# Patient Record
Sex: MALE | Race: AMERICAN INDIAN OR ALASKA NATIVE
[De-identification: names, ages, dates, MRNs, and addresses within clinical notes are randomized per-mention and may not be internally consistent; named-entity substitution may affect disease eponyms.]

---

## 2019-12-23 ENCOUNTER — HOSPITAL ENCOUNTER (INPATIENT)
Dept: HOSPITAL 5 - ED | Age: 74
LOS: 1 days | Discharge: HOME | DRG: 282 | End: 2019-12-24
Attending: INTERNAL MEDICINE | Admitting: INTERNAL MEDICINE
Payer: MEDICARE

## 2019-12-23 DIAGNOSIS — E78.5: ICD-10-CM

## 2019-12-23 DIAGNOSIS — Z82.49: ICD-10-CM

## 2019-12-23 DIAGNOSIS — I10: ICD-10-CM

## 2019-12-23 DIAGNOSIS — I21.4: Primary | ICD-10-CM

## 2019-12-23 DIAGNOSIS — R00.1: ICD-10-CM

## 2019-12-23 DIAGNOSIS — M19.90: ICD-10-CM

## 2019-12-23 DIAGNOSIS — Z91.14: ICD-10-CM

## 2019-12-23 DIAGNOSIS — N40.0: ICD-10-CM

## 2019-12-23 DIAGNOSIS — F12.90: ICD-10-CM

## 2019-12-23 LAB
ALBUMIN SERPL-MCNC: 4.4 G/DL (ref 3.9–5)
ALT SERPL-CCNC: 16 UNITS/L (ref 7–56)
APTT BLD: 29.9 SEC. (ref 24.2–36.6)
BASOPHILS # (AUTO): 0 K/MM3 (ref 0–0.1)
BASOPHILS NFR BLD AUTO: 0.5 % (ref 0–1.8)
BUN SERPL-MCNC: 6 MG/DL (ref 9–20)
BUN/CREAT SERPL: 8 %
CALCIUM SERPL-MCNC: 9.4 MG/DL (ref 8.4–10.2)
EOSINOPHIL # BLD AUTO: 0 K/MM3 (ref 0–0.4)
EOSINOPHIL NFR BLD AUTO: 0.2 % (ref 0–4.3)
HCT VFR BLD CALC: 43.5 % (ref 35.5–45.6)
HDLC SERPL-MCNC: 66 MG/DL (ref 40–59)
HEMOLYSIS INDEX: 11
HGB BLD-MCNC: 14.6 GM/DL (ref 11.8–15.2)
INR PPP: 0.93 (ref 0.87–1.13)
LYMPHOCYTES # BLD AUTO: 1.1 K/MM3 (ref 1.2–5.4)
LYMPHOCYTES NFR BLD AUTO: 28.7 % (ref 13.4–35)
MCHC RBC AUTO-ENTMCNC: 34 % (ref 32–34)
MCV RBC AUTO: 93 FL (ref 84–94)
MONOCYTES # (AUTO): 0.3 K/MM3 (ref 0–0.8)
MONOCYTES % (AUTO): 8 % (ref 0–7.3)
PLATELET # BLD: 169 K/MM3 (ref 140–440)
RBC # BLD AUTO: 4.66 M/MM3 (ref 3.65–5.03)

## 2019-12-23 PROCEDURE — 80053 COMPREHEN METABOLIC PANEL: CPT

## 2019-12-23 PROCEDURE — 93458 L HRT ARTERY/VENTRICLE ANGIO: CPT

## 2019-12-23 PROCEDURE — 80061 LIPID PANEL: CPT

## 2019-12-23 PROCEDURE — 93005 ELECTROCARDIOGRAM TRACING: CPT

## 2019-12-23 PROCEDURE — 93010 ELECTROCARDIOGRAM REPORT: CPT

## 2019-12-23 PROCEDURE — 85025 COMPLETE CBC W/AUTO DIFF WBC: CPT

## 2019-12-23 PROCEDURE — 36415 COLL VENOUS BLD VENIPUNCTURE: CPT

## 2019-12-23 PROCEDURE — 85610 PROTHROMBIN TIME: CPT

## 2019-12-23 PROCEDURE — 83036 HEMOGLOBIN GLYCOSYLATED A1C: CPT

## 2019-12-23 PROCEDURE — C1894 INTRO/SHEATH, NON-LASER: HCPCS

## 2019-12-23 PROCEDURE — 71045 X-RAY EXAM CHEST 1 VIEW: CPT

## 2019-12-23 PROCEDURE — 84484 ASSAY OF TROPONIN QUANT: CPT

## 2019-12-23 PROCEDURE — 85730 THROMBOPLASTIN TIME PARTIAL: CPT

## 2019-12-23 NOTE — EMERGENCY DEPARTMENT REPORT
ED Chest Pain HPI





- General


Chief Complaint: Chest Pain


Stated Complaint: EUGENE/CHEST PAIN


Time Seen by Provider: 12/23/19 12:20


Source: patient


Mode of arrival: Ambulatory


Limitations: No Limitations





- History of Present Illness


Initial Comments: 





74-year-old male with a past medical history of hypertension presents to the 

hospital complaining of substernal chest pain since 10 AM.  Pain started while 

at rest and while sitting in the bed. Pain is constant but fluctuating in 

intensity.  Pain is described as a burning aching.  Pain is 7-8/10 in intensity.

 Patient denies nausea, vomiting, shortness of breath, diaphoresis, or 

lightheadedness.  Patient also denies history of PE/DVT, calf tenderness, recent

travel, or recent surgery.  Patient does not smoke cigarettes but smokes 

marijuana on occasion.  Although he has a diagnosis hypertension he has not had 

medications in the past 3 years.  Patient reports he had a stress test 

approximately 3 years ago and it was "okay".  No history of cardiac, CAD, or MI.

 He denies family history of CAD prior to that is a 65.  Patient does not take 

aspirin daily.  His primary care doctor is affiliated with [facility]





initial triage pulse ox on RA 99%, rr 20


Severity scale (0 -10): 0





- Related Data


                                Home Medications











 Medication  Instructions  Recorded  Confirmed  Last Taken


 


lisinopriL [Zestril] 40 mg PO QDAY 07/14/15 07/14/15 Unknown











                                    Allergies











Allergy/AdvReac Type Severity Reaction Status Date / Time


 


No Known Allergies Allergy   Verified 07/14/15 14:41














Heart Score





- HEART Score


History: Slightly suspicious


EKG: Non-specific


Age: > 65


Risk factors: 1-2 risk factors


Troponin: 1-3x normal limit


HEART Score: 5





ED Review of Systems


ROS: 


Stated complaint: EUGENE/CHEST PAIN


Other details as noted in HPI





Comment: All other systems reviewed and negative





ED Past Medical Hx





- Past Medical History


Previous Medical History?: Yes


Hx Hypertension: Yes


Hx Arthritis: Yes





- Surgical History


Past Surgical History?: No





- Social History


Smoking Status: Never Smoker


Substance Use Type: None





- Medications


Home Medications: 


                                Home Medications











 Medication  Instructions  Recorded  Confirmed  Last Taken  Type


 


lisinopriL [Zestril] 40 mg PO QDAY 07/14/15 07/14/15 Unknown History














ED Physical Exam





- General


Limitations: No Limitations





- Other


Other exam information: 





General: No acute distress


Head: Atraumatic


Eyes: normal appearance


ENT: Moist mucous membranes


Neck: Normal appearance, no midline tenderness


Chest: Clear to auscultation bilaterally, chest wall nontender


CV: Regular rate and rhythm


Abdomen: Soft, normal bowel sounds, nontender, nondistended, no rebound or 

guarding


Back: Normal inspection


Extremity: Normal inspection infection, full range of motion, no calf tenderness

 or leg edema


Neuro: Alert O x 3, no facial asymmetry, speech clear, no gross motor sensory 

deficit


Psych: Appropriate behavior


Skin: No rash





ED Course


                                   Vital Signs











  12/23/19





  12:21


 


Temperature 97.8 F


 


Pulse Rate 53 L


 


Blood Pressure 187/87














- Reevaluation(s)


Reevaluation #1: 





12/23/19 16:06


pt is pain free








ANATOLIY score





- Anatoliy Score


Age > 65: (1) Yes


3 or more CAD Risk Factors: (0) No


2 or more Angina events in past 24 hrs: (1) Yes


Known CAD with more than 50% Stenosis: (0) No


Elevated Cardiac Markers: (0) No


ST Deviation Greater than 0.5mm: (0) No





ED Medical Decision Making





- Lab Data


Result diagrams: 


                                 12/23/19 13:58





                                 12/23/19 13:58








                                   Lab Results











  12/23/19 12/23/19 12/23/19 Range/Units





  13:58 13:58 15:07 


 


WBC  3.9 L    (4.5-11.0)  K/mm3


 


RBC  4.66    (3.65-5.03)  M/mm3


 


Hgb  14.6    (11.8-15.2)  gm/dl


 


Hct  43.5    (35.5-45.6)  %


 


MCV  93    (84-94)  fl


 


MCH  31    (28-32)  pg


 


MCHC  34    (32-34)  %


 


RDW  13.3    (13.2-15.2)  %


 


Plt Count  169    (140-440)  K/mm3


 


Lymph % (Auto)  28.7    (13.4-35.0)  %


 


Mono % (Auto)  8.0 H    (0.0-7.3)  %


 


Eos % (Auto)  0.2    (0.0-4.3)  %


 


Baso % (Auto)  0.5    (0.0-1.8)  %


 


Lymph #  1.1 L    (1.2-5.4)  K/mm3


 


Mono #  0.3    (0.0-0.8)  K/mm3


 


Eos #  0.0    (0.0-0.4)  K/mm3


 


Baso #  0.0    (0.0-0.1)  K/mm3


 


Seg Neutrophils %  62.6    (40.0-70.0)  %


 


Seg Neutrophils #  2.5    (1.8-7.7)  K/mm3


 


Sodium   141   (137-145)  mmol/L


 


Potassium   4.5   (3.6-5.0)  mmol/L


 


Chloride   102.8   ()  mmol/L


 


Carbon Dioxide   22   (22-30)  mmol/L


 


Anion Gap   21   mmol/L


 


BUN   6 L   (9-20)  mg/dL


 


Creatinine   0.8   (0.8-1.5)  mg/dL


 


Estimated GFR   > 60   ml/min


 


BUN/Creatinine Ratio   8   %


 


Glucose   84   ()  mg/dL


 


Calcium   9.4   (8.4-10.2)  mg/dL


 


Total Bilirubin   0.60   (0.1-1.2)  mg/dL


 


AST   22   (5-40)  units/L


 


ALT   16   (7-56)  units/L


 


Alkaline Phosphatase   73   ()  units/L


 


Troponin T   0.015  0.068 H D  (0.00-0.029)  ng/mL


 


Total Protein   7.4   (6.3-8.2)  g/dL


 


Albumin   4.4   (3.9-5)  g/dL


 


Albumin/Globulin Ratio   1.5   %


 


Triglycerides    74  (2-149)  mg/dL


 


Cholesterol    165  ()  mg/dL


 


LDL Cholesterol Direct    87  ()  mg/dL














- EKG Data


-: EKG Interpreted by Me (rbbb)


EKG shows normal: sinus rhythm, ST-T waves (no stemi)


Rate: normal





- Radiology Data


Radiology results: report reviewed (cxr: HTN sánchez)





- Medical Decision Making





cp that resolved prior to my evaluation


trop elevated (nl renal fxt)


asa, heparin drip, card consult in ed


admission for nstemi





- Differential Diagnosis


cp, pe, stemi, disection, msk pain, mi, unstable angina


Critical Care Time: Yes


Critical care time in (mins) excluding proc time.: 35


Critical care attestation.: 


If time is entered above; I have spent that time in minutes in the direct care 

of this critically ill patient, excluding procedure time.








ED Disposition


Clinical Impression: 


 Chest pain, Non-STEMI (non-ST elevated myocardial infarction), HTN 

(hypertension), Noncompliance with medication regimen





Disposition: DC-09 OP ADMIT IP TO THIS HOSP


Is pt being admited?: Yes


Does the pt Need Aspirin: Yes


Condition: Stable


Time of Disposition: 16:00 (Dr Brown/hosp)

## 2019-12-23 NOTE — XRAY REPORT
CHEST 1 VIEW 



INDICATION: 

Chest Pain.



COMPARISON: 

None.



FINDINGS:

Support devices: None.



Heart: Within normal limits. 

Lungs/Pleura: The lungs are normally expanded and clear. No airspace disease or pleural effusion. 



Additional findings: Aortic tortuosity and elongation.



IMPRESSION:

1. No acute findings. 

2. Hypertensive changes in the aorta.



Signer Name: Dennis Lo MD 

Signed: 12/23/2019 1:12 PM

 Workstation Name: [id number]

## 2019-12-23 NOTE — EVENT NOTE
ED Screening Note


Date of service: 12/23/19


Time: 12:21


ED Screening Note: 





C/o substernal CP x 10 am


denies hx of MI/CVA/DVT/PE


Hx of HTN


aching pain


denies SOB





This initial assessment/diagnostic orders/clinical plan/treatment(s) is/are 

subject to change based on patients health status, clinical progression and re-

assessment by fellow clinical providers in the ED. Further treatment and workup 

at subsequent clinical providers discretion. Patient/guardian urged not to elope

from the ED as their condition may be serious if not clinically assessed and 

managed. 





Initial orders include: MAIN


Aspirin 


CXR


Labs


EKG

## 2019-12-23 NOTE — CONSULTATION
History of Present Illness


Consult date: 12/23/19


Requesting physician: ELISE SNYDER


Consult reason: chest pain


History of present illness: 


Mr. Cotton is a 75 y/o  male who presented to the ED with CP 

that began this morning and gradually worsened throughout the course of the day.

 He describes it as occurring on the right side, substernal and non-radiating.  

He denies accompanying SOB, nausea, vomiting or diaphoresis.  EKG NAF, but 

troponins mildly elevated to 0.015 and 0.068.  His medical history is 

significant for hypertension and BPH.  He had an echocardiogram in our office in

2017, but does not see any cardiologist regularly.  He reports having a stress 

test about three years ago, but he does not know where it occurred.





The echocardiogram in 2017 found a normal EF and no significant abnormalities.  








Past History


Past Medical History: hypertension, other (BPH)





Medications and Allergies


                                    Allergies











Allergy/AdvReac Type Severity Reaction Status Date / Time


 


No Known Allergies Allergy   Verified 07/14/15 14:41











                                Home Medications











 Medication  Instructions  Recorded  Confirmed  Last Taken  Type


 


lisinopriL [Zestril] 40 mg PO QDAY 07/14/15 07/14/15 Unknown History











Active Meds: 


Active Medications





Heparin Sodium/Sodium Chloride (Heparin/ 0.45% Nacl-25,000 Unit/500 Ml)  25,000 

unit in 500 mls @ 25.29 mls/hr IV TITRATE ERIN; Protocol











Review of Systems


All systems: negative


Cardiovascular: chest pain





Physical Examination


                                   Vital Signs











Temp Pulse BP


 


 97.8 F   53 L  187/87 


 


 12/23/19 12:21  12/23/19 12:21  12/23/19 12:21











General appearance: no acute distress


HEENT: Positive: PERRL


Neck: Positive: neck supple


Cardiac: Positive: Reg Rate and Rhythm


Lungs: Positive: Normal Exam


Neuro: Positive: Grossly Intact


Abdomen: Positive: Unremarkable


Male genitourinary: Positive: deferred


Skin: Positive: Clear


Musculoskeletal: Normal Range of Motion


Extremities: Present: normal





Results





                                 12/23/19 13:58





                                 12/23/19 13:58


                                 Cardiac Enzymes











  12/23/19 Range/Units





  13:58 


 


AST  22  (5-40)  units/L








                                     Lipids











  12/23/19 Range/Units





  15:07 


 


Triglycerides  74  (2-149)  mg/dL


 


Cholesterol  165  ()  mg/dL








                                       CBC











  12/23/19 Range/Units





  13:58 


 


WBC  3.9 L  (4.5-11.0)  K/mm3


 


RBC  4.66  (3.65-5.03)  M/mm3


 


Hgb  14.6  (11.8-15.2)  gm/dl


 


Hct  43.5  (35.5-45.6)  %


 


Plt Count  169  (140-440)  K/mm3


 


Lymph #  1.1 L  (1.2-5.4)  K/mm3


 


Mono #  0.3  (0.0-0.8)  K/mm3


 


Eos #  0.0  (0.0-0.4)  K/mm3


 


Baso #  0.0  (0.0-0.1)  K/mm3








                          Comprehensive Metabolic Panel











  12/23/19 Range/Units





  13:58 


 


Sodium  141  (137-145)  mmol/L


 


Potassium  4.5  (3.6-5.0)  mmol/L


 


Chloride  102.8  ()  mmol/L


 


Carbon Dioxide  22  (22-30)  mmol/L


 


BUN  6 L  (9-20)  mg/dL


 


Creatinine  0.8  (0.8-1.5)  mg/dL


 


Glucose  84  ()  mg/dL


 


Calcium  9.4  (8.4-10.2)  mg/dL


 


AST  22  (5-40)  units/L


 


ALT  16  (7-56)  units/L


 


Alkaline Phosphatase  73  ()  units/L


 


Total Protein  7.4  (6.3-8.2)  g/dL


 


Albumin  4.4  (3.9-5)  g/dL














- Imaging and Cardiology


Echo: report reviewed (2017: EF 50-55%)





EKG interpretations





- Telemetry


EKG Rhythm: Sinus Rhythm


AV and intraventricular conduction: right bundle branch block





Assessment and Plan


Mr. Cotton is a 75 y/o male admitted with chest pain.  CE elevation and CP 

consistent with NSTEMI.  Heparin drip initiated in ED.  Will obtain stress test 

in AM.  NPO after midnight.





The patient has been seen in conjunction with Dr. Miller, who agrees with the 

assessment and plan.














- Patient Problems


(1) Non-STEMI (non-ST elevated myocardial infarction)


Current Visit: Yes   Status: Acute   





(2) Chest pain


Current Visit: Yes   Status: Acute   





(3) Hypertension


Current Visit: Yes   Status: Chronic   





(4) BPH (benign prostatic hyperplasia)


Current Visit: Yes   Status: Chronic

## 2019-12-24 VITALS — SYSTOLIC BLOOD PRESSURE: 138 MMHG | DIASTOLIC BLOOD PRESSURE: 89 MMHG

## 2019-12-24 LAB
ALBUMIN SERPL-MCNC: 3.8 G/DL (ref 3.9–5)
ALT SERPL-CCNC: 14 UNITS/L (ref 7–56)
BASOPHILS # (AUTO): 0 K/MM3 (ref 0–0.1)
BASOPHILS NFR BLD AUTO: 0.5 % (ref 0–1.8)
BUN SERPL-MCNC: 6 MG/DL (ref 9–20)
BUN/CREAT SERPL: 8 %
CALCIUM SERPL-MCNC: 9 MG/DL (ref 8.4–10.2)
EOSINOPHIL # BLD AUTO: 0.1 K/MM3 (ref 0–0.4)
EOSINOPHIL NFR BLD AUTO: 1.7 % (ref 0–4.3)
HCT VFR BLD CALC: 41.4 % (ref 35.5–45.6)
HEMOLYSIS INDEX: 11
HGB BLD-MCNC: 14 GM/DL (ref 11.8–15.2)
LYMPHOCYTES # BLD AUTO: 2.1 K/MM3 (ref 1.2–5.4)
LYMPHOCYTES NFR BLD AUTO: 52.9 % (ref 13.4–35)
MCHC RBC AUTO-ENTMCNC: 34 % (ref 32–34)
MCV RBC AUTO: 92 FL (ref 84–94)
MONOCYTES # (AUTO): 0.4 K/MM3 (ref 0–0.8)
MONOCYTES % (AUTO): 9.2 % (ref 0–7.3)
PLATELET # BLD: 154 K/MM3 (ref 140–440)
RBC # BLD AUTO: 4.5 M/MM3 (ref 3.65–5.03)

## 2019-12-24 PROCEDURE — B2111ZZ FLUOROSCOPY OF MULTIPLE CORONARY ARTERIES USING LOW OSMOLAR CONTRAST: ICD-10-PCS | Performed by: INTERNAL MEDICINE

## 2019-12-24 PROCEDURE — 4A023N7 MEASUREMENT OF CARDIAC SAMPLING AND PRESSURE, LEFT HEART, PERCUTANEOUS APPROACH: ICD-10-PCS | Performed by: INTERNAL MEDICINE

## 2019-12-24 PROCEDURE — B2151ZZ FLUOROSCOPY OF LEFT HEART USING LOW OSMOLAR CONTRAST: ICD-10-PCS | Performed by: INTERNAL MEDICINE

## 2019-12-24 NOTE — CARDIAC CATHERIZATION REPORT
INDICATION FOR PROCEDURE:  The patient is a 74-year-old -American

gentleman with no significant past medical history of coronary artery disease,

was admitted with chest pain and cardiac enzymes were found to be elevated

consistent with non-STEMI.  Because of abnormal cardiac enzymes, the patient was

scheduled for cardiac catheterization for definitive diagnosis and treatment. 

The patient is aware of the procedure, potential complications and alternatives

of therapy available.



DESCRIPTION OF PROCEDURE:  The patient was brought to the catheterization

laboratory in a fasting condition, was evaluated for appropriateness for

moderate sedation and he was felt to be appropriate candidate and the patient

received IV Versed and fentanyl starting at 12:02 a.m.  The patient was

monitored throughout with EKG monitoring and pulse oximetry in addition to

hemodynamic monitoring.  The patient was prepared in a standard fashion. 

Sterile drapes were applied.  Local anesthesia was given in the right wrist area

followed by accessing the right radial artery with 21-gauge arterial puncture

needle.  A 5-Upper sorbian slender sheath was introduced.  A 6-Upper sorbian multipurpose

catheter was used to obtain the angiograms of the left coronary artery in

multiple views followed by angiograms of the right coronary artery and left

ventriculogram was performed in WIGGINS projection and Egyptian projection using hand

injection.  At the end of the procedure, catheter and sheath were removed and

good hemostasis was achieved with radial band application.  The patient

tolerated the procedure well.  At the end of the procedure, the patient was

awake, communicating normally and breathing normally with no focal deficits. 

Sedation monitoring ended at 12:16 noon time.  No untoward complications were

noted.  Following findings were noted.



HEMODYNAMICS:

1.  Opening aortic pressure 151/66, left ventricular pressure 151/17.  No

gradient across the aortic valve.  Estimated ejection fraction 50-55%.

2.  Left ventriculogram done in WIGGINS projection and Egyptian projection showed normal

sized left ventricle with normal contractility.  End-diastolic pressure is upper

limits of normal around 70 mmHg.  Mitral regurgitation could not be evaluated

because of limited amount of dye injected.

3.  Right coronary artery dominant vessel arises normally from right coronary

cusp, angiographically smooth and normal.

4.  Left coronary artery arises normally from left coronary cusp.  Left main is

very short, immediately dividing into LAD and circumflex branches.  Left main,

LAD and its branches and circumflex artery and its major branch are

angiographically smooth and normal.



FINAL IMPRESSION:

1.  Normal sized left ventricle with normal contractility end-diastolic

pressure, upper limits of normal.

2.  Essentially angiographically smooth, normal coronary anatomy noted.  Blood

flow was found to be somewhat sluggish.  Otherwise, no other abnormalities

noted.



The patient will be continued on risk factor modification.  Significance of

elevated troponin levels is not clear.  Findings were explained to the patient. 

The patient was transferred to the room in stable condition.  No hematoma noted

in the right wrist area.  The patient's sedation started at 12:02 noon time and

ended at 12:16 noon time.  No untoward complications noted.





DD: 12/24/2019 12:26

DT: 12/24/2019 12:47

JOB# [id number]  [id number]

DRK/NTS

## 2019-12-24 NOTE — DISCHARGE SUMMARY
Providers





- Providers


Date of Admission: 


12/23/19 16:03





Date of discharge: 12/24/19


Attending physician: 


GEO YBARRA





                                        





12/23/19 15:53


Consult to Physician [CONS] Urgent 


   Comment: Dr. Barksdale seeing patient @ 2012


   Consulting Provider: CHYNA BARKSDALE


   Physician Instructions: 


   Reason For Exam: non stemi











Primary care physician: 


PRIMARY CARE MD








Hospitalization


Condition: Stable


Procedures: 


LHC --Normal





Hospital course: 


Discharge summary dictated


See in reports





Disposition: DC-01 TO HOME OR SELFCARE





Core Measure Documentation





- Palliative Care


Palliative Care/ Comfort Measures: Not Applicable





- Core Measures


Any of the following diagnoses?: none





Exam





- Constitutional


Vitals: 


                                        











Temp Pulse Resp BP Pulse Ox


 


 98.5 F   57 L  16   162/80   98 


 


 12/24/19 02:58  12/24/19 02:58  12/24/19 02:58  12/24/19 02:58  12/24/19 08:33











General appearance: Present: no acute distress, well-nourished





- EENT


Eyes: Present: PERRL


ENT: hearing intact, clear oral mucosa





- Neck


Neck: Present: supple, normal ROM





- Respiratory


Respiratory effort: normal


Respiratory: bilateral: CTA





- Cardiovascular


Heart rate: 78


Rhythm: regular


Heart Sounds: Present: S1 & S2.  Absent: rub, click





- Extremities


Extremities: no ischemia, pulses intact, pulses symmetrical, No edema


Peripheral Pulses: within normal limits





- Abdominal


General gastrointestinal: Present: soft, non-tender, non-distended, normal bowel

 sounds


Male genitourinary: Present: normal





- Rectal


Rectal Exam: deferred





- Integumentary


Integumentary: Present: clear, warm, dry





- Musculoskeletal


Musculoskeletal: gait normal, strength equal bilaterally





- Psychiatric


Psychiatric: appropriate mood/affect, intact judgment & insight





- Neurologic


Neurologic: CNII-XII intact, moves all extremities





- Allied Health


Allied health notes reviewed: nursing, case management





Plan


Activity: no restrictions


Diet: low salt


Follow up with: 


PRIMARY CARE,MD [Primary Care Provider] - 7 Days


TIARA STACK MD [Staff Physician] - 7 Days

## 2019-12-24 NOTE — HISTORY AND PHYSICAL REPORT
CHIEF COMPLAINT:  Chest pain since 10:00 a.m.



HISTORY OF PRESENT ILLNESS:  A 74-year-old male with a history of hypertension

and arthritis, comes in for chest pain since 10:00 a.m.  Chest pain occurred at

rest and chest pain is about 8 on a scale of 1-10 in intensity.  No shortness of

breath.  No diaphoresis.  No palpitations.  No radiation.  No prior history of

PE or DVT or calf tenderness.  Smokes marijuana occasionally.  The patient does

not take medications for hypertension.  The patient had a stress test 3 years

ago and apparently was normal.  No coronary artery disease.



PAST MEDICAL HISTORY:  Significant for hypertension and arthritis.



PAST SURGICAL HISTORY:  None.



SOCIAL HISTORY:  Marijuana occasionally.



FAMILY HISTORY:  Hypertension.



REVIEW OF SYSTEMS:  Significant for left-sided and retrosternal chest pain, 8 on

a scale of 1-10.  Sudden onset at 10:00 a.m.  Otherwise, review of systems was

negative.



PHYSICAL EXAMINATION:

GENERAL:  Elderly male, cooperative during examination.

VITAL SIGNS:  Blood pressure is 187/87, temperature is 97.8, pulse is 53, sats

are 100%.

HEENT:  Unremarkable.  Pupils equal and reactive.

NECK:  Supple, no lymphadenopathy, no thyromegaly.

LUNGS:  Clear to auscultation and percussion.  Good air entry.

CARDIOVASCULAR:  S1, S2 heard.  No gallop, no murmur, no rub.  Apical impulse in

left fifth intercostal space and midclavicular line.

ABDOMEN:  Soft and benign.  No hepatosplenomegaly.  No guarding, no rigidity. 

Hernial orifices are normal.

EXTREMITIES:  Good pedal pulses.  No pedal edema.

CENTRAL NERVOUS SYSTEM:  Alert and oriented x 4, nonfocal exam.



LABORATORY DATA AND IMAGING STUDIES:  EKG shows heart rate of 56 per minute

ectopic premature complexes and couplets, a right bundle branch block present. 

Chest x-ray shows no acute findings.  Hypertensive changes in the aorta.  CBC is

near normal.  Electrolytes are normal.  First troponin is 0.066, second troponin

is 0.253.  HDL cholesterol is 66.



ASSESSMENT AND PLAN:

1.  Non-ST elevation myocardial infarction.  The patient to be taken for

catheterization tomorrow on 12/24/2019.IV Heparin per protocol.Carduiology on 
board.

2.  Hypertension.  Continue antihypertensives in the form of lisinopril. 

Marijuana use.  The patient counseled.

3.  Deep venous thrombosis prophylaxis.  The patient on heparin drip.



ADDENDUM:

The patient initiated on heparin drip for non-STEMI.



In summary, the patient has non-STEMI, hypertension, hyperlipidemia, and

marijuana use.





DD: 12/24/2019 16:20

DT: 12/24/2019 16:39

JOB# [id number]  [id number]

MILDRED/OLIVE HOWELL

## 2019-12-24 NOTE — PROGRESS NOTE
Assessment and Plan


LHC is negative.  He may discharge home in 2-3 hours after standard radial site 

care.  Continue aspirin, Plavix and statin.  No BB d/t bradycardia.  Will 

increase Norvasc to optimize blood pressures.  Follow up with Dr. Cancino in 7-

10 days ([phone]).





The patient has been seen in conjunction with Dr. Cancino, who agrees with the 

assessment and plan.














- Patient Problems


(1) Non-STEMI (non-ST elevated myocardial infarction)


Current Visit: Yes   Status: Acute   





(2) Chest pain


Current Visit: Yes   Status: Acute   





(3) Hypertension


Current Visit: Yes   Status: Chronic   





(4) BPH (benign prostatic hyperplasia)


Current Visit: Yes   Status: Chronic   





Subjective


Date of service: 12/24/19


Interval history: 


Patient is s/p LHC to right radial site.  LHC negative.  He has no complaints 

and is currently stable.  








Objective





                                Last Vital Signs











Temp  98.5 F   12/24/19 02:58


 


Pulse  57 L  12/24/19 02:58


 


Resp  16   12/24/19 02:58


 


BP  162/80   12/24/19 02:58


 


Pulse Ox  98   12/24/19 08:33














- Physical Examination


General: No Apparent Distress


HEENT: Positive: PERRL


Neck: Positive: neck supple


Cardiac: Positive: Reg Rate and Rhythm


Lungs: Positive: Normal Exam


Neuro: Positive: Grossly Intact


Abdomen: Positive: Unremarkable


Skin: Positive: Clear


Musculoskeletal: Normal Range of Motion


Extremities: Present: normal





- Labs and Meds


                                 Cardiac Enzymes











  12/23/19 12/24/19 Range/Units





  13:58 03:57 


 


AST  22  24  (5-40)  units/L








                                   Coagulation











  12/23/19 Range/Units





  16:51 


 


PT  12.6  (12.2-14.9)  Sec.


 


INR  0.93  (0.87-1.13)  


 


APTT  29.9  (24.2-36.6)  Sec.








                                     Lipids











  12/23/19 Range/Units





  15:07 


 


Triglycerides  74  (2-149)  mg/dL


 


Cholesterol  165  ()  mg/dL


 


HDL Cholesterol  66 H  (40-59)  mg/dL


 


Cholesterol/HDL Ratio  2.50  %








                                       CBC











  12/23/19 12/24/19 Range/Units





  13:58 03:57 


 


WBC  3.9 L  4.0 L  (4.5-11.0)  K/mm3


 


RBC  4.66  4.50  (3.65-5.03)  M/mm3


 


Hgb  14.6  14.0  (11.8-15.2)  gm/dl


 


Hct  43.5  41.4  (35.5-45.6)  %


 


Plt Count  169  154  (140-440)  K/mm3


 


Lymph #  1.1 L  2.1  (1.2-5.4)  K/mm3


 


Mono #  0.3  0.4  (0.0-0.8)  K/mm3


 


Eos #  0.0  0.1  (0.0-0.4)  K/mm3


 


Baso #  0.0  0.0  (0.0-0.1)  K/mm3








                          Comprehensive Metabolic Panel











  12/23/19 12/24/19 Range/Units





  13:58 03:57 


 


Sodium  141  143  (137-145)  mmol/L


 


Potassium  4.5  3.6  (3.6-5.0)  mmol/L


 


Chloride  102.8  106.7  ()  mmol/L


 


Carbon Dioxide  22  23  (22-30)  mmol/L


 


BUN  6 L  6 L  (9-20)  mg/dL


 


Creatinine  0.8  0.8  (0.8-1.5)  mg/dL


 


Glucose  84  88  ()  mg/dL


 


Calcium  9.4  9.0  (8.4-10.2)  mg/dL


 


AST  22  24  (5-40)  units/L


 


ALT  16  14  (7-56)  units/L


 


Alkaline Phosphatase  73  62  ()  units/L


 


Total Protein  7.4  6.5  (6.3-8.2)  g/dL


 


Albumin  4.4  3.8 L  (3.9-5)  g/dL














- Imaging and Cardiology


Echo: report reviewed (2017: EF 50-55%)


Cardiac cath: pending


AV and intraventricular conduction: right bundle branch block

## 2022-04-10 ENCOUNTER — HOSPITAL ENCOUNTER (EMERGENCY)
Dept: HOSPITAL 5 - ED | Age: 77
Discharge: HOME | End: 2022-04-10
Payer: MEDICARE

## 2022-04-10 VITALS — SYSTOLIC BLOOD PRESSURE: 161 MMHG | DIASTOLIC BLOOD PRESSURE: 88 MMHG

## 2022-04-10 DIAGNOSIS — I10: ICD-10-CM

## 2022-04-10 DIAGNOSIS — R42: Primary | ICD-10-CM

## 2022-04-10 PROCEDURE — 93005 ELECTROCARDIOGRAM TRACING: CPT

## 2022-04-10 PROCEDURE — 99282 EMERGENCY DEPT VISIT SF MDM: CPT

## 2022-04-10 NOTE — EMERGENCY DEPARTMENT REPORT
HPI





- General


Chief Complaint: Dizziness


Time Seen by Provider: 04/10/22 12:33





- HPI


HPI: 


Since this morning the patient has been experiencing moderate vertigo.  Whenever

he moves his head he feels like the world is turning.  He has had mild nausea 

with this without any vomiting.  He also has had a mild diffuse pressure-like 

headache.  He denies vomiting fever chills focal weakness or any other 

associated symptoms. He has not taken any medications for this.  He has had 

prior episodes like this but never sought medical care.





ED Past Medical Hx





- Past Medical History


Hx Hypertension: Yes


Hx Arthritis: Yes





- Social History


Smoking Status: Unknown if ever smoked





- Medications


Home Medications: 


                                Home Medications











 Medication  Instructions  Recorded  Confirmed  Last Taken  Type


 


Aspirin EC [Halfprin EC] 81 mg PO QDAY #100 tablet. 12/24/19  Unknown Rx


 


amLODIPine 10 mg PO QDAY 30 Days #30 tablet 12/24/19  Unknown Rx


 


lisinopriL [Zestril TAB] 40 mg PO QDAY #30 tablet 12/24/19  Unknown Rx


 


Meclizine [Antivert] 25 mg PO TID PRN 10 Days #30 tab 04/10/22  Unknown Rx


 


Ondansetron [Zofran Odt] 4 mg PO Q8HR PRN #12 tab.rapdis 04/10/22  Unknown Rx














ED Review of Systems


ROS: 


Stated complaint: DIZZINESS/FAINTING/CHEST PAIN


Other details as noted in HPI





Comment: All other systems reviewed and negative





Physical Exam





- Physical Exam


Vital Signs: 


                                   Vital Signs











  04/10/22 04/10/22





  12:14 12:35


 


Temperature 97.8 F 


 


Pulse Rate 61 


 


Respiratory 22 





Rate  


 


Blood Pressure 177/86 


 


O2 Sat by Pulse 100 100





Oximetry  











Physical Exam: 


Physical Exam


Constitutional:  


   General: No acute distress.


   Appearance: No diaphoresis. 


HENT: 


   Head: Normocephalic. 


Eyes: 


   Pupils: Pupils are equal, round, and reactive to light. 


Neck: 


   Musculoskeletal: Normal range of motion. 


Cardiovascular: 


   Rate and Rhythm: Normal rate and regular rhythm. 


   Pulses: Intact distal pulses. 


   Heart sounds: Normal heart sounds. No murmur. 


Pulmonary: 


   Effort: No respiratory distress. 


   Breath sounds: No wheezing or rales. 


Chest: 


   Chest wall: No tenderness. 


Abdominal: 


   General: There is no distension. 


   Palpations: There is no mass. 


   Tenderness: There is no abdominal tenderness. There is no guarding or 

rebound. 


Musculoskeletal: Normal range of motion. 


Skin:


   General: Skin is warm and dry. 


Neurological: 


   Mental Status: Alert and oriented to person, place, and time.  The patient 

had an abnormal head impulse test with no direction changing nystagmus and no 

vertical strabismus nor malalignment.  NIH stroke scale equals 0.


Psychiatric:    


   Mood and Affect: Mood and affect normal.    


   Cognition and Memory: Memory normal.    


   Judgment: Judgment normal. 











ED Course


                                   Vital Signs











  04/10/22 04/10/22





  12:14 12:35


 


Temperature 97.8 F 


 


Pulse Rate 61 


 


Respiratory 22 





Rate  


 


Blood Pressure 177/86 


 


O2 Sat by Pulse 100 100





Oximetry  











Critical care attestation.: 


If time is entered above; I have spent that time in minutes in the direct care 

of this critically ill patient, excluding procedure time.








ED Disposition


Clinical Impression: 


 Vertigo





Disposition: 01 HOME / SELF CARE / HOMELESS


Is pt being admited?: No


Does the pt Need Aspirin: No


Condition: Stable


Instructions:  Dizziness, Easy-to-Read


Prescriptions: 


Meclizine [Antivert] 25 mg PO TID PRN 10 Days #30 tab


 PRN Reason: Vertigo


Ondansetron [Zofran Odt] 4 mg PO Q8HR PRN #12 tab.rapdis


 PRN Reason: Nausea


Time of Disposition: 13:00

## 2022-04-10 NOTE — ELECTROCARDIOGRAPH REPORT
[facility]

                                       

Test Date:    2022-04-10               Test Time:    12:21:02

Pat Name:     HOLLY JASMINE           Department:   

Patient ID:   [id number]          Room:          

Gender:       M                        Technician:   DARYA DELUCAB:          1945               Requested By: AVERY ASHLEY

Order Number: [id number]              Reading MD:   Warner Cancino

                                 Measurements

Intervals                              Axis          

Rate:         54                       P:            56

OK:           184                      QRS:          -48

QRSD:         146                      T:            25

QT:           451                                    

QTc:          428                                    

                           Interpretive Statements

Sinus rhythm

RBBB and LAFB

No previous ECG available for comparison

Electronically Signed On 4- 16:27:48 EDT by Warner Cancino